# Patient Record
Sex: MALE | Race: WHITE | URBAN - METROPOLITAN AREA
[De-identification: names, ages, dates, MRNs, and addresses within clinical notes are randomized per-mention and may not be internally consistent; named-entity substitution may affect disease eponyms.]

---

## 2017-06-04 ENCOUNTER — HOSPITAL ENCOUNTER (EMERGENCY)
Facility: CLINIC | Age: 22
Discharge: HOME | End: 2017-06-04
Attending: PHYSICIAN ASSISTANT | Admitting: PHYSICIAN ASSISTANT
Payer: COMMERCIAL

## 2017-06-04 ENCOUNTER — APPOINTMENT (OUTPATIENT)
Dept: GENERAL RADIOLOGY | Facility: CLINIC | Age: 22
End: 2017-06-04
Attending: PHYSICIAN ASSISTANT
Payer: COMMERCIAL

## 2017-06-04 VITALS
HEART RATE: 78 BPM | SYSTOLIC BLOOD PRESSURE: 162 MMHG | RESPIRATION RATE: 16 BRPM | OXYGEN SATURATION: 99 % | DIASTOLIC BLOOD PRESSURE: 95 MMHG | TEMPERATURE: 97.6 F

## 2017-06-04 DIAGNOSIS — S69.82XA: ICD-10-CM

## 2017-06-04 DIAGNOSIS — S62.625A CLOSED DISPLACED FRACTURE OF MIDDLE PHALANX OF LEFT RING FINGER, INITIAL ENCOUNTER: ICD-10-CM

## 2017-06-04 PROCEDURE — 73140 X-RAY EXAM OF FINGER(S): CPT | Mod: LT

## 2017-06-04 PROCEDURE — 99213 OFFICE O/P EST LOW 20 MIN: CPT

## 2017-06-04 PROCEDURE — 99213 OFFICE O/P EST LOW 20 MIN: CPT | Performed by: PHYSICIAN ASSISTANT

## 2017-06-04 NOTE — ED AVS SNAPSHOT
[facility] Emergency Department    [address] [address]    Phone:  [phone]    Fax:  [phone]                                       Cornelio Lindsay   MRN: [id number]    Department:  [facility] Emergency Department   Date of Visit:  6/4/2017           After Visit Summary Signature Page     I have received my discharge instructions, and my questions have been answered. I have discussed any challenges I see with this plan with the nurse or doctor.    ..........................................................................................................................................  Patient/Patient Representative Signature      ..........................................................................................................................................  Patient Representative Print Name and Relationship to Patient    ..................................................               ................................................  Date                                            Time    ..........................................................................................................................................  Reviewed by Signature/Title    ...................................................              ..............................................  Date                                                            Time

## 2017-06-04 NOTE — DISCHARGE INSTRUCTIONS
Please follow up here in 3 weeks.        Finger Fracture, Closed  You have a broken finger (fracture). This causes local pain, swelling, and bruising. This injury takes about 4 to 6 weeks to heal. Finger injuries are often treated with a splint or cast, or by taping the injured finger to the next one (jackie taping). This protects the injured finger and holds the bone in position while it heals. More serious fractures may need surgery.    If the fingernail has been severely injured, it will probably fall off in 1 to 2 weeks. A new fingernail will usually start to grow back within a month.  Home care  Follow these guidelines when caring for yourself at home:    Keep your hand elevated to reduce pain and swelling. When sitting or lying down keep your arm above the level of your heart. You can do this by placing your arm on a pillow that rests on your chest or on a pillow at your side. This is most important during the first 2 days (48 hours) after the injury.    Put an ice pack on the injured area. Do this for 20 minutes every 1 to 2 hours the first day for pain relief. You can make an ice pack by wrapping a plastic bag of ice cubes in a thin towel. As the ice melts, be careful that the cast or splint doesn t get wet. Continue using the ice pack 3 to 4 times a day until the pain and swelling go away.    Keep the cast or splint completely dry at all times. Bathe with your cast or splint out of the water. Protect it with a large plastic bag, rubber-banded at the top end. If a fiberglass cast or splint gets wet, you can dry it with a hair dryer.    If jackie tape was put on and it becomes wet or dirty, change it. You may replace it with paper, plastic, or cloth tape. Cloth tape and paper tapes must be kept dry. Keep the jackie tape in place for at least 4 weeks.    You may use acetaminophen or ibuprofen to control pain, unless another pain medicine was prescribed. If you have chronic liver or kidney disease, talk with your  health care provider before using these medicines. Also talk with your provider if you ve had a stomach ulcer or GI bleeding.    Don t put creams or objects under the cast if you have itching.  Follow-up care  Follow up with your health care provider within 1 week, or as advised. This is to make sure the bone is healing the way it should.  If X-rays were taken, a radiologist will look at them. You will be told of any new findings that may affect your care.  When to seek medical advice  Call your health care provider right away if any of these occur:    The plaster cast or splint becomes wet or soft    The cast cracks    The fiberglass cast or splint stays wet for more than 24 hours    Pain or swelling gets worse    Redness, warmth, swelling, drainage from the wound, or foul odor from a cast or splint    Finger becomes more cold, blue, numb, or tingly    You can t move your finger    The skin around the cast becomes red    Fever of 101 F (38.3 C) or higher, or as directed by your health care provider    7304-7738 [employer]. [address] [place] [address], [place] [address]. All rights reserved. This information is not intended as a substitute for professional medical care. Always follow your healthcare professional's instructions.

## 2017-06-04 NOTE — ED PROVIDER NOTES
SUBJECTIVE:  Cornelio Lindsay is a 21 year old male who sustained a left ring finger injury 1hours ago. Mechanism of injury: Patient was holding on to a rope swing and felt some pain after he let go. Immediate symptoms: immediate pain, immediate swelling. Symptoms have been unchanged since that time. Prior history of related problems: no prior problems with this area in the past.     OBJECTIVE:     Vital signs were reviewed and noted by me.  BP (!) 162/95  Pulse 78  Temp 97.6  F (36.4  C)  Resp 16  SpO2 99%  Appearance: in no apparent distress, well developed and well nourished, non-toxic and cooperative.  Hand and wrist exam: soft tissue tenderness and swelling at the PIP joint of the L ring finger.  Full range of motion of the digts.  No deformity.  Finger is neurologically intact.  Cap refill less than 2 seconds.    X-ray: Shows a displaced angulated fracture of the distal middle phalange.     ASSESSMENT:    (S69.82XA) Ring avulsion injury of finger, left, initial encounter  Plan: Fingers XR, 2-3 views, left           PLAN:     Patient placed in finger splint supplied in clinic today.  Ice and ibuprofen.  I would like him to follow up with his PCP in 3 weeks.  Patient given appointment to establish care and follow up here in 3 weeks.     Twan Barrios  6/4/2017, 1:01 PM       Twan Barrios PA-C  06/04/17 [id number]

## 2017-06-04 NOTE — ED AVS SNAPSHOT
[facility] Emergency Department    [address] [address]    Phone:  [phone]    Fax:  [phone]                                       Cornelio Lindsay   MRN: [id number]    Department:  [facility] Emergency Department   Date of Visit:  6/4/2017           Patient Information     Date Of Birth          1995        Your diagnoses for this visit were:     Ring avulsion injury of finger, left, initial encounter     Closed displaced fracture of middle phalanx of left ring finger, initial encounter        You were seen by Twan Barrios PA-C.        Discharge Instructions       Please follow up here in 3 weeks.        Finger Fracture, Closed  You have a broken finger (fracture). This causes local pain, swelling, and bruising. This injury takes about 4 to 6 weeks to heal. Finger injuries are often treated with a splint or cast, or by taping the injured finger to the next one (jackie taping). This protects the injured finger and holds the bone in position while it heals. More serious fractures may need surgery.    If the fingernail has been severely injured, it will probably fall off in 1 to 2 weeks. A new fingernail will usually start to grow back within a month.  Home care  Follow these guidelines when caring for yourself at home:    Keep your hand elevated to reduce pain and swelling. When sitting or lying down keep your arm above the level of your heart. You can do this by placing your arm on a pillow that rests on your chest or on a pillow at your side. This is most important during the first 2 days (48 hours) after the injury.    Put an ice pack on the injured area. Do this for 20 minutes every 1 to 2 hours the first day for pain relief. You can make an ice pack by wrapping a plastic bag of ice cubes in a thin towel. As the ice melts, be careful that the cast or splint doesn t get wet. Continue using the ice pack 3 to 4 times a day until the pain and swelling go  away.    Keep the cast or splint completely dry at all times. Bathe with your cast or splint out of the water. Protect it with a large plastic bag, rubber-banded at the top end. If a fiberglass cast or splint gets wet, you can dry it with a hair dryer.    If jackie tape was put on and it becomes wet or dirty, change it. You may replace it with paper, plastic, or cloth tape. Cloth tape and paper tapes must be kept dry. Keep the jackie tape in place for at least 4 weeks.    You may use acetaminophen or ibuprofen to control pain, unless another pain medicine was prescribed. If you have chronic liver or kidney disease, talk with your health care provider before using these medicines. Also talk with your provider if you ve had a stomach ulcer or GI bleeding.    Don t put creams or objects under the cast if you have itching.  Follow-up care  Follow up with your health care provider within 1 week, or as advised. This is to make sure the bone is healing the way it should.  If X-rays were taken, a radiologist will look at them. You will be told of any new findings that may affect your care.  When to seek medical advice  Call your health care provider right away if any of these occur:    The plaster cast or splint becomes wet or soft    The cast cracks    The fiberglass cast or splint stays wet for more than 24 hours    Pain or swelling gets worse    Redness, warmth, swelling, drainage from the wound, or foul odor from a cast or splint    Finger becomes more cold, blue, numb, or tingly    You can t move your finger    The skin around the cast becomes red    Fever of 101 F (38.3 C) or higher, or as directed by your health care provider    3024-5132 [employer]. [address] [place] [address], [place] [address]. All rights reserved. This information is not intended as a substitute for professional medical care. Always follow your healthcare professional's instructions.          24 Hour Appointment Hotline       To make an  "appointment at any [place] clinic, call [phone] ([phone]). If you don't have a family doctor or clinic, we will help you find one. [facility] clinics are conveniently located to serve the needs of you and your family.             Review of your medicines      Notice     You have not been prescribed any medications.            Procedures and tests performed during your visit     Fingers XR, 2-3 views, left      Orders Needing Specimen Collection     None      Pending Results     No orders found from 6/2/2017 to 6/5/2017.            Pending Culture Results     No orders found from 6/2/2017 to 6/5/2017.            Pending Results Instructions     If you had any lab results that were not finalized at the time of your Discharge, you can call the ED Lab Result RN at [phone]. You will be contacted by this team for any positive Lab results or changes in treatment. The nurses are available 7 days a week from 10A to 6:30P.  You can leave a message 24 hours per day and they will return your call.        Test Results From Your Hospital Stay        6/4/2017  1:21 PM      Narrative     XR FINGER LT G/E 2 VW 6/4/2017 1:17 PM    HISTORY: Injury.    COMPARISON: None.        Impression     IMPRESSION: Mildly displaced oblique fracture of the fourth middle  phalanx.    ABBIE PEREZ MD                Thank you for choosing [facility]       Thank you for choosing [facility] for your care. Our goal is always to provide you with excellent care. Hearing back from our patients is one way we can continue to improve our services. Please take a few minutes to complete the written survey that you may receive in the mail after you visit with us. Thank you!        [employer] Information     [employer] lets you send messages to your doctor, view your test results, renew your prescriptions, schedule appointments and more. To sign up, go to [email or web address] . Click on \"Log in\" on the left side of the screen, which will take you to " "the Welcome page. Then click on \"Sign up Now\" on the right side of the page.     You will be asked to enter the access code listed below, as well as some personal information. Please follow the directions to create your username and password.     Your access code is: [email or web address]  Expires: 2017  1:23 PM     Your access code will  in 90 days. If you need help or a new code, please call your [place] clinic or [phone].        Care EveryWhere ID     This is your Care EveryWhere ID. This could be used by other organizations to access your [facility] medical records  [id number]        After Visit Summary       This is your record. Keep this with you and show to your community pharmacist(s) and doctor(s) at your next visit.                  "

## 2017-07-13 ENCOUNTER — OFFICE VISIT (OUTPATIENT)
Dept: FAMILY MEDICINE | Facility: CLINIC | Age: 22
End: 2017-07-13
Payer: COMMERCIAL

## 2017-07-13 ENCOUNTER — RADIANT APPOINTMENT (OUTPATIENT)
Dept: GENERAL RADIOLOGY | Facility: CLINIC | Age: 22
End: 2017-07-13
Attending: FAMILY MEDICINE
Payer: COMMERCIAL

## 2017-07-13 VITALS
SYSTOLIC BLOOD PRESSURE: 126 MMHG | BODY MASS INDEX: 37.41 KG/M2 | WEIGHT: 276.2 LBS | HEART RATE: 65 BPM | RESPIRATION RATE: 14 BRPM | TEMPERATURE: 97.7 F | HEIGHT: 72 IN | DIASTOLIC BLOOD PRESSURE: 80 MMHG

## 2017-07-13 DIAGNOSIS — S62.625D CLOSED DISPLACED FRACTURE OF MIDDLE PHALANX OF LEFT RING FINGER WITH ROUTINE HEALING, SUBSEQUENT ENCOUNTER: Primary | ICD-10-CM

## 2017-07-13 DIAGNOSIS — S69.82XD: ICD-10-CM

## 2017-07-13 PROCEDURE — 99213 OFFICE O/P EST LOW 20 MIN: CPT | Performed by: FAMILY MEDICINE

## 2017-07-13 PROCEDURE — 73140 X-RAY EXAM OF FINGER(S): CPT | Mod: LT

## 2017-07-13 NOTE — NURSING NOTE
"Chief Complaint   Patient presents with     Fracture     Follow up fracture of left ring finger. Initial injury was 06/04/2017. Patient reports his finger is no longer painful but he's not sure if it has healed correctly.        Initial /57 (BP Location: Right arm, Patient Position: Chair, Cuff Size: Adult Large)  Pulse 65  Temp 97.7  F (36.5  C) (Tympanic)  Ht 6' 0.25\" (1.835 m)  Wt 276 lb 3.2 oz (125.3 kg)  BMI 37.2 kg/m2 Estimated body mass index is 37.2 kg/(m^2) as calculated from the following:    Height as of this encounter: 6' 0.25\" (1.835 m).    Weight as of this encounter: 276 lb 3.2 oz (125.3 kg).  Medication Reconciliation: complete   Yoselyn Young, ZENON        "

## 2017-07-13 NOTE — PROGRESS NOTES
"  SUBJECTIVE:                                                    Cornelio Lindsay is a 21 year old male who presents to clinic today for the following health issues:      Chief Complaint   Patient presents with     Fracture     Follow up fracture of left ring finger. Initial injury was 06/04/2017. Patient reports his finger is no longer painful but he's not sure if it has healed correctly.        Patient was diagnosed with left ring finger avulsion injury and closed fracture of middle phalanx mildly displaced oblique.    Patient states he has not had pain on the left 4th finger for the last 10 days.  Has had mild pain a few times when he  on something hardly.  Patient reports the finger is \"crooked\" but not swelling or erythematous.  Patient denies any other complaints today.  Patient states he was able to play [employer] w/o issues.  Patient denies limitation of ADL.    Patient states he only wore the splint for a few days.  Patient works as a  - does frequently lift objects at work.    Problem list and histories reviewed & adjusted, as indicated.  Additional history: as documented    There is no problem list on file for this patient.    History reviewed. No pertinent surgical history.    Social History   Substance Use Topics     Smoking status: Never Smoker     Smokeless tobacco: Not on file     Alcohol use Yes     History reviewed. No pertinent family history.      No current outpatient prescriptions on file.     No Known Allergies    Reviewed and updated as needed this visit by clinical staff  Tobacco  Allergies  Meds  Med Hx  Surg Hx  Fam Hx  Soc Hx      Reviewed and updated as needed this visit by Provider         ROS:  C: NEGATIVE for fever, chills, change in weight  I: NEGATIVE for worrisome rashes, moles or lesions  MUSCULOSKELETAL:see above  N: NEGATIVE for weakness, dizziness or paresthesias  H: NEGATIVE for bleeding problems    OBJECTIVE:                                          " "          /80  Pulse 65  Temp 97.7  F (36.5  C) (Tympanic)  Resp 14  Ht 6' 0.25\" (1.835 m)  Wt 276 lb 3.2 oz (125.3 kg)  BMI 37.2 kg/m2  Body mass index is 37.2 kg/(m^2).  GEN: alert, oriented x 3, NAD  LEFT 4TH FINGER: no swelling/discoloration/wound; slight dorsal curvature of the middle phalanx and slight palmar deviation of the PIP; no tenderness; full range of motion with no crepitation  LEFT HAND: good  strength  Diagnostic test results:  Diagnostic Test Results:  Results for orders placed or performed during the hospital encounter of 06/04/17   Fingers XR, 2-3 views, left    Narrative    XR FINGER LT G/E 2 VW 6/4/2017 1:17 PM    HISTORY: Injury.    COMPARISON: None.      Impression    IMPRESSION: Mildly displaced oblique fracture of the fourth middle  phalanx.    ABBIE PEREZ MD        ASSESSMENT/PLAN:                                                        ICD-10-CM    1. Closed displaced fracture of middle phalanx of left ring finger with routine healing, subsequent encounter S62.625D XR Finger Left G/E 2 Views     ORTHO  REFERRAL  Image today appears similar to previous xray in terms of the fracture. Will await fial radiology report.  Patient is concerned about cost of ortho consult - patient was advised he may observe if he develops functional change of the left hand in next 1-2 weeks, and then see ortho if present.  He said he will find out cost, and schedule in next 1-2 weeks.     2. Ring avulsion injury of finger, left, subsequent encounter S69.82XD ORTHO  REFERRAL  See above.  Xray report pending.  Patient was advised to wear finger splint until seen by ortho.   Return precautions discussed and given to patient.      Follow up with Provider - ortho appt 1-2 weeks   Patient Instructions   Final radiology report to be called to you in 24 hrs.    You will be contacted in 1-5  business days to get a schedule for the orthopedic specialist  You may schedule it in the next " 1-2 weeks.    Avoid heavy pressure or use of the left hand that would involve the ring finger.    If with severe pain, or more deformity of left ring finger or limitation of hand use, see provider.    Use the splint for the left ring finger to protect it at work for another 2 weeks at least.        Wilver Garcia MD  [facility]

## 2017-07-13 NOTE — PATIENT INSTRUCTIONS
Final radiology report to be called to you in 24 hrs.    You will be contacted in 1-5  business days to get a schedule for the orthopedic specialist  You may schedule it in the next 1-2 weeks.    Avoid heavy pressure or use of the left hand that would involve the ring finger.    If with severe pain, or more deformity of left ring finger or limitation of hand use, see provider.    Use the splint for the left ring finger to protect it at work for another 2 weeks at least.

## 2017-07-13 NOTE — MR AVS SNAPSHOT
After Visit Summary   7/13/2017    Cornelio Lindsay    MRN: [id number]           Patient Information     Date Of Birth          1995        Visit Information        Provider Department      7/13/2017 2:00 PM Wilver Garcia MD [facility]        Today's Diagnoses     Closed displaced fracture of middle phalanx of left ring finger with routine healing, subsequent encounter    -  1    Ring avulsion injury of finger, left, subsequent encounter          Care Instructions    Final radiology report to be called to you in 24 hrs.    You will be contacted in 1-5  business days to get a schedule for the orthopedic specialist  You may schedule it in the next 1-2 weeks.    Avoid heavy pressure or use of the left hand that would involve the ring finger.    If with severe pain, or more deformity of left ring finger or limitation of hand use, see provider.    Use the splint for the left ring finger to protect it at work for another 2 weeks at least.            Follow-ups after your visit        Additional Services     ORTHO  REFERRAL       [facility] is referring you to the Orthopedic  Services at [facility].       The  Representative will assist you in the coordination of your Orthopedic and Musculoskeletal Care as prescribed by your physician.    The  Representative will call you within 1 business day to help schedule your appointment, or you may contact the  Representative at:    All areas ~ [phone]     Type of Referral : Non Surgical       Timeframe requested: 3 - 5 days    Coverage of these services is subject to the terms and limitations of your health insurance plan.  Please call member services at your health plan with any benefit or coverage questions.      If X-rays, CT or MRI's have been performed, please contact the facility where they were done to arrange for , prior to your  "scheduled appointment.  Please bring this referral request to your appointment and present it to your specialist.                  Follow-up notes from your care team     Return if symptoms worsen or fail to improve.      Who to contact     If you have questions or need follow up information about today's clinic visit or your schedule please contact [facility] directly at [phone].  Normal or non-critical lab and imaging results will be communicated to you by MyChart, letter or phone within 4 business days after the clinic has received the results. If you do not hear from us within 7 days, please contact the clinic through [employer] or phone. If you have a critical or abnormal lab result, we will notify you by phone as soon as possible.  Submit refill requests through [employer] or call your pharmacy and they will forward the refill request to us. Please allow 3 business days for your refill to be completed.          Additional Information About Your Visit        [employer] Information     [employer] lets you send messages to your doctor, view your test results, renew your prescriptions, schedule appointments and more. To sign up, go to [email or web address] . Click on \"Log in\" on the left side of the screen, which will take you to the Welcome page. Then click on \"Sign up Now\" on the right side of the page.     You will be asked to enter the access code listed below, as well as some personal information. Please follow the directions to create your username and password.     Your access code is: [email or web address]  Expires: 2017  1:23 PM     Your access code will  in 90 days. If you need help or a new code, please call your [facility] or [phone].        Care EveryWhere ID     This is your Care EveryWhere ID. This could be used by other organizations to access your [facility] medical records  [id number]        Your Vitals Were     Pulse Temperature Respirations Height BMI (Body Mass Index)       " "65 97.7  F (36.5  C) (Tympanic) 14 6' 0.25\" (1.835 m) 37.2 kg/m2        Blood Pressure from Last 3 Encounters:   07/13/17 126/80   06/04/17 (!) 162/95    Weight from Last 3 Encounters:   07/13/17 276 lb 3.2 oz (125.3 kg)              We Performed the Following     ORTHO  REFERRAL     XR Finger Left G/E 2 Views        Primary Care Provider    Md Other Clinic                Equal Access to Services     LEIGHANN CAMPOS : Hadii aad ku hadasho Soomaali, waaxda luqadaha, qaybta kaalmada adeegyada, waxay idiin hayaan adeeg emersonaraelena lakeatonn . So [facility] [phone].    ATENCIÓN: Si habla español, tiene a lanier disposición servicios gratuitos de asistencia lingüística. Llame al [phone].    We comply with applicable federal civil rights laws and [place] laws. We do not discriminate on the basis of race, color, national origin, age, disability sex, sexual orientation or gender identity.            Thank you!     Thank you for choosing [facility]  for your care. Our goal is always to provide you with excellent care. Hearing back from our patients is one way we can continue to improve our services. Please take a few minutes to complete the written survey that you may receive in the mail after your visit with us. Thank you!             Your Updated Medication List - Protect others around you: Learn how to safely use, store and throw away your medicines at www.disposemymeds.org.      Notice  As of 7/13/2017  2:39 PM    You have not been prescribed any medications.      "